# Patient Record
Sex: MALE | Race: OTHER | URBAN - METROPOLITAN AREA
[De-identification: names, ages, dates, MRNs, and addresses within clinical notes are randomized per-mention and may not be internally consistent; named-entity substitution may affect disease eponyms.]

---

## 2017-03-13 ENCOUNTER — TELEPHONE (OUTPATIENT)
Dept: FAMILY MEDICINE CLINIC | Facility: CLINIC | Age: 47
End: 2017-03-13

## 2017-03-13 NOTE — TELEPHONE ENCOUNTER
Pt states he has been feeling very bad congested, headaches,cough, sore throat and fever. Pt states he has tried everything OTC and nothing has helped. Please advise.

## 2017-03-13 NOTE — TELEPHONE ENCOUNTER
Reason for Call/Chief Complaint: Congestion, cough , headache, sore throat   Onset: 1 week ago  Nursing Assessment/Associated Symptoms: Pt stated that for the past week he has been having congestion,sore throat and a headache.  Pt stated that the fever was

## 2017-03-14 ENCOUNTER — OFFICE VISIT (OUTPATIENT)
Dept: FAMILY MEDICINE CLINIC | Facility: CLINIC | Age: 47
End: 2017-03-14

## 2017-03-14 VITALS
HEART RATE: 79 BPM | HEIGHT: 66 IN | TEMPERATURE: 98 F | WEIGHT: 224.81 LBS | BODY MASS INDEX: 36.13 KG/M2 | DIASTOLIC BLOOD PRESSURE: 84 MMHG | SYSTOLIC BLOOD PRESSURE: 125 MMHG

## 2017-03-14 DIAGNOSIS — J32.9 SINUSITIS, UNSPECIFIED CHRONICITY, UNSPECIFIED LOCATION: Primary | ICD-10-CM

## 2017-03-14 PROCEDURE — 99212 OFFICE O/P EST SF 10 MIN: CPT | Performed by: FAMILY MEDICINE

## 2017-03-14 PROCEDURE — 99213 OFFICE O/P EST LOW 20 MIN: CPT | Performed by: FAMILY MEDICINE

## 2017-03-14 RX ORDER — AMOXICILLIN AND CLAVULANATE POTASSIUM 875; 125 MG/1; MG/1
1 TABLET, FILM COATED ORAL 2 TIMES DAILY
Qty: 20 TABLET | Refills: 0 | Status: SHIPPED | OUTPATIENT
Start: 2017-03-14 | End: 2017-03-24

## 2017-03-14 RX ORDER — FLUTICASONE PROPIONATE 50 MCG
2 SPRAY, SUSPENSION (ML) NASAL DAILY
Qty: 1 BOTTLE | Refills: 0 | Status: SHIPPED | OUTPATIENT
Start: 2017-03-14 | End: 2017-07-12

## 2017-03-14 NOTE — PROGRESS NOTES
Patient ID: Jose J Dean is a 42year old male. HPI  Patient presents with:  Nasal Congestion: sore throat, headaches x 1 week. He got sick on March 5, 2017. He is tried over-the-counter medicines but continues to be sick.   He is having a stuffy nos Negative for fever and fatigue. HENT: Positive for congestion, postnasal drip, rhinorrhea and sinus pressure. Respiratory: Positive for cough. Negative for chest tightness. Gastrointestinal: Negative for vomiting.    Neurological: Positive for heada visit:    Sinusitis, unspecified chronicity, unspecified location  -     Amoxicillin-Pot Clavulanate (AUGMENTIN) 875-125 MG Oral Tab;  Take 1 tablet by mouth 2 (two) times daily.  -     Fluticasone Propionate 50 MCG/ACT Nasal Suspension; 2 sprays by Nasal r

## 2017-07-19 ENCOUNTER — APPOINTMENT (OUTPATIENT)
Dept: OTHER | Facility: HOSPITAL | Age: 47
End: 2017-07-19
Attending: EMERGENCY MEDICINE

## 2023-10-05 ENCOUNTER — APPOINTMENT (OUTPATIENT)
Dept: CT IMAGING | Facility: HOSPITAL | Age: 53
End: 2023-10-05
Attending: EMERGENCY MEDICINE

## 2023-10-05 ENCOUNTER — HOSPITAL ENCOUNTER (OUTPATIENT)
Age: 53
Discharge: ANOTHER HOSPITAL | End: 2023-10-05
Attending: STUDENT IN AN ORGANIZED HEALTH CARE EDUCATION/TRAINING PROGRAM

## 2023-10-05 ENCOUNTER — HOSPITAL ENCOUNTER (EMERGENCY)
Facility: HOSPITAL | Age: 53
Discharge: HOME | End: 2023-10-05
Attending: EMERGENCY MEDICINE

## 2023-10-05 ENCOUNTER — APPOINTMENT (OUTPATIENT)
Dept: GENERAL RADIOLOGY | Age: 53
End: 2023-10-05
Attending: STUDENT IN AN ORGANIZED HEALTH CARE EDUCATION/TRAINING PROGRAM

## 2023-10-05 VITALS
WEIGHT: 240 LBS | BODY MASS INDEX: 36.37 KG/M2 | OXYGEN SATURATION: 96 % | HEART RATE: 72 BPM | DIASTOLIC BLOOD PRESSURE: 99 MMHG | HEIGHT: 68 IN | TEMPERATURE: 98 F | SYSTOLIC BLOOD PRESSURE: 114 MMHG | RESPIRATION RATE: 17 BRPM

## 2023-10-05 VITALS
HEART RATE: 73 BPM | TEMPERATURE: 98 F | SYSTOLIC BLOOD PRESSURE: 138 MMHG | RESPIRATION RATE: 16 BRPM | DIASTOLIC BLOOD PRESSURE: 87 MMHG | OXYGEN SATURATION: 99 %

## 2023-10-05 DIAGNOSIS — R07.9 CHEST PAIN OF UNCERTAIN ETIOLOGY: Primary | ICD-10-CM

## 2023-10-05 DIAGNOSIS — R07.9 CHEST PAIN, RULE OUT ACUTE MYOCARDIAL INFARCTION: Primary | ICD-10-CM

## 2023-10-05 LAB
#MXD IC: 0.2 X10ˆ3/UL (ref 0.1–1)
BUN BLD-MCNC: 17 MG/DL (ref 7–18)
CHLORIDE BLD-SCNC: 103 MMOL/L (ref 98–112)
CO2 BLD-SCNC: 24 MMOL/L (ref 21–32)
CREAT BLD-MCNC: 1.1 MG/DL
EGFRCR SERPLBLD CKD-EPI 2021: 82 ML/MIN/1.73M2 (ref 60–?)
GLUCOSE BLD-MCNC: 87 MG/DL (ref 70–99)
HCT VFR BLD AUTO: 46.6 %
HCT VFR BLD CALC: 51 %
HGB BLD-MCNC: 15 G/DL
ISTAT IONIZED CALCIUM FOR CHEM 8: 1.21 MMOL/L (ref 1.12–1.32)
LYMPHOCYTES # BLD AUTO: 2.1 X10ˆ3/UL (ref 1–4)
LYMPHOCYTES NFR BLD AUTO: 27.3 %
MCH RBC QN AUTO: 25.4 PG (ref 26–34)
MCHC RBC AUTO-ENTMCNC: 32.2 G/DL (ref 31–37)
MCV RBC AUTO: 79 FL (ref 80–100)
MIXED CELL %: 3.1 %
NEUTROPHILS # BLD AUTO: 5.3 X10ˆ3/UL (ref 1.5–7.7)
NEUTROPHILS NFR BLD AUTO: 69.6 %
PLATELET # BLD AUTO: 266 X10ˆ3/UL (ref 150–450)
POTASSIUM BLD-SCNC: 4 MMOL/L (ref 3.6–5.1)
RBC # BLD AUTO: 5.9 X10ˆ6/UL
SODIUM BLD-SCNC: 142 MMOL/L (ref 136–145)
TROPONIN I BLD-MCNC: <0.02 NG/ML
TROPONIN I HIGH SENSITIVITY: 8 NG/L
WBC # BLD AUTO: 7.6 X10ˆ3/UL (ref 4–11)

## 2023-10-05 PROCEDURE — 93005 ELECTROCARDIOGRAM TRACING: CPT

## 2023-10-05 PROCEDURE — 36415 COLL VENOUS BLD VENIPUNCTURE: CPT

## 2023-10-05 PROCEDURE — 85025 COMPLETE CBC W/AUTO DIFF WBC: CPT | Performed by: STUDENT IN AN ORGANIZED HEALTH CARE EDUCATION/TRAINING PROGRAM

## 2023-10-05 PROCEDURE — 93010 ELECTROCARDIOGRAM REPORT: CPT

## 2023-10-05 PROCEDURE — 80047 BASIC METABLC PNL IONIZED CA: CPT

## 2023-10-05 PROCEDURE — 71046 X-RAY EXAM CHEST 2 VIEWS: CPT | Performed by: STUDENT IN AN ORGANIZED HEALTH CARE EDUCATION/TRAINING PROGRAM

## 2023-10-05 PROCEDURE — 99284 EMERGENCY DEPT VISIT MOD MDM: CPT

## 2023-10-05 PROCEDURE — 99205 OFFICE O/P NEW HI 60 MIN: CPT

## 2023-10-05 PROCEDURE — 84484 ASSAY OF TROPONIN QUANT: CPT | Performed by: EMERGENCY MEDICINE

## 2023-10-05 PROCEDURE — 71260 CT THORAX DX C+: CPT | Performed by: EMERGENCY MEDICINE

## 2023-10-05 PROCEDURE — 84484 ASSAY OF TROPONIN QUANT: CPT

## 2023-10-05 NOTE — ED INITIAL ASSESSMENT (HPI)
Pt presents from [facility] s/p presenting to IC for intermittent chest pain for the last two days.  EKG abnormal with CXR showing widened mediastinum with request for CT to r/o aortic dissection or aneurysm/

## 2023-10-05 NOTE — DISCHARGE INSTRUCTIONS
Your chest x-ray is concerning for mild widening of the mediastinum. This is a nonspecific finding, but there are no prior for comparison. It is concerning as this finding can be associated with an aortic dissection which is tearing of the inner layer of the aorta which is one of the main vessels of the heart and this can be life-threatening. Therefore, I recommend you present immediately to the emergency department for further assessment.

## 2023-10-05 NOTE — ED INITIAL ASSESSMENT (HPI)
Patient arrives ambulatory with c/o intermittent chest pain since Monday along with right arm tingling. Reports mild chest pain at this time. Denies smoking, denies shortness of breath. Denies fever/cough.

## 2023-10-05 NOTE — DISCHARGE INSTRUCTIONS
Tylenol for mild discomfort. Return to the ER for changes or worsening. Read all instructions for further recommendations. Avoid significant exertional activity until seen by the cardiologist.  Please call for rapid appointment for further evaluation.

## 2023-10-06 LAB
ATRIAL RATE: 65 BPM
ATRIAL RATE: 72 BPM
P AXIS: 25 DEGREES
P AXIS: 39 DEGREES
P-R INTERVAL: 156 MS
P-R INTERVAL: 174 MS
Q-T INTERVAL: 396 MS
Q-T INTERVAL: 400 MS
QRS DURATION: 88 MS
QRS DURATION: 90 MS
QTC CALCULATION (BEZET): 416 MS
QTC CALCULATION (BEZET): 433 MS
R AXIS: 43 DEGREES
R AXIS: 45 DEGREES
T AXIS: 23 DEGREES
T AXIS: 27 DEGREES
VENTRICULAR RATE: 65 BPM
VENTRICULAR RATE: 72 BPM

## 2024-01-26 ENCOUNTER — HOSPITAL ENCOUNTER (OUTPATIENT)
Age: 54
Discharge: HOME | End: 2024-01-26
Payer: COMMERCIAL

## 2024-01-26 VITALS
SYSTOLIC BLOOD PRESSURE: 135 MMHG | DIASTOLIC BLOOD PRESSURE: 75 MMHG | OXYGEN SATURATION: 97 % | RESPIRATION RATE: 20 BRPM | HEART RATE: 83 BPM | TEMPERATURE: 97 F

## 2024-01-26 DIAGNOSIS — R09.82 POST-NASAL DRIP: ICD-10-CM

## 2024-01-26 DIAGNOSIS — J01.90 ACUTE NON-RECURRENT SINUSITIS, UNSPECIFIED LOCATION: Primary | ICD-10-CM

## 2024-01-26 PROCEDURE — 99213 OFFICE O/P EST LOW 20 MIN: CPT

## 2024-01-26 PROCEDURE — 99214 OFFICE O/P EST MOD 30 MIN: CPT

## 2024-01-26 RX ORDER — CETIRIZINE HYDROCHLORIDE 10 MG/1
10 TABLET ORAL DAILY
Qty: 30 TABLET | Refills: 0 | Status: SHIPPED | OUTPATIENT
Start: 2024-01-26 | End: 2024-02-25

## 2024-01-26 RX ORDER — FLUTICASONE PROPIONATE 50 MCG
2 SPRAY, SUSPENSION (ML) NASAL DAILY
Qty: 16 G | Refills: 0 | Status: SHIPPED | OUTPATIENT
Start: 2024-01-26 | End: 2024-02-25

## 2024-01-26 RX ORDER — AMOXICILLIN AND CLAVULANATE POTASSIUM 875; 125 MG/1; MG/1
1 TABLET, FILM COATED ORAL 2 TIMES DAILY
Qty: 14 TABLET | Refills: 0 | Status: SHIPPED | OUTPATIENT
Start: 2024-01-26 | End: 2024-02-02

## 2024-01-26 NOTE — ED PROVIDER NOTES
Patient Seen in: [facility]      History     Chief Complaint   Patient presents with    Cough     Stated Complaint: sinus    Subjective:   HPI    Patient is a 53-year-old Male with no significant past medical history, presenting to immediate care for evaluation of concern for possible sinus infection.  Onset: 1.5 months.  Initial cold.  Since has been having intermittent frontal headache, migraine headaches, nasal congestion, postnasal drip, cough, subjective wheezing.  Has been taking over-the-counter medications with minimal relief.  Symptoms are intermittent.  Worsening this week.  Concern for possible sinus infection.  Past history of sinus infection.  Denies any fevers.  No facial swelling.  No nasal drainage.  No neck stiffness.  No chest pain or shortness of breath.  No weakness or confusion.  Not immunocompromise.    Objective:   History reviewed. No pertinent past medical history.           Past Surgical History:   Procedure Laterality Date    REMOVAL GALLBLADDER                  Social History     Socioeconomic History    Marital status:    Tobacco Use    Smoking status: Never    Smokeless tobacco: Never   Vaping Use    Vaping Use: Never used   Substance and Sexual Activity    Alcohol use: Yes     Alcohol/week: 0.0 standard drinks of alcohol     Comment: 2 beers occasionally    Drug use: No    Sexual activity: Yes   Other Topics Concern    Caffeine Concern Yes     Comment: coffee, soda, 1 cup daily    Pt has a pacemaker No    Pt has a defibrillator No    Reaction to local anesthetic No              Review of Systems   Constitutional:  Negative for chills and fever.   HENT:  Positive for congestion, postnasal drip and sinus pain. Negative for trouble swallowing and voice change.    Eyes:  Negative for visual disturbance.   Respiratory:  Positive for cough.    Gastrointestinal:  Negative for diarrhea and vomiting.   Musculoskeletal:  Negative for neck stiffness.   Skin:  Negative for  rash.   Allergic/Immunologic: Negative for immunocompromised state.   Neurological:  Positive for headaches. Negative for dizziness, seizures, weakness and light-headedness.   Psychiatric/Behavioral:  Negative for confusion.    All other systems reviewed and are negative.      Positive for stated complaint: sinus  Other systems are as noted in HPI.  Constitutional and vital signs reviewed.      All other systems reviewed and negative except as noted above.    Physical Exam     ED Triage Vitals [01/26/24 1631]   /75   Pulse 83   Resp 20   Temp 97.2 °F (36.2 °C)   Temp src Temporal   SpO2 97 %   O2 Device None (Room air)       Current:/75   Pulse 83   Temp 97.2 °F (36.2 °C) (Temporal)   Resp 20   SpO2 97%         Physical Exam  Vitals and nursing note reviewed.   Constitutional:       General: He is not in acute distress.     Appearance: Normal appearance. He is obese. He is not ill-appearing, toxic-appearing or diaphoretic.      Comments: Alert, cooperative, pleasant, nontoxic-appearing   HENT:      Head: Normocephalic and atraumatic.      Right Ear: Tympanic membrane normal.      Left Ear: Tympanic membrane normal.      Nose: Congestion present.      Comments: Enlarged nasal turbinates, nasal congestion, postnasal drip.     Mouth/Throat:      Mouth: Mucous membranes are moist.      Pharynx: No oropharyngeal exudate or posterior oropharyngeal erythema.   Eyes:      Conjunctiva/sclera: Conjunctivae normal.   Cardiovascular:      Rate and Rhythm: Normal rate.      Pulses: Normal pulses.   Pulmonary:      Effort: Pulmonary effort is normal. No respiratory distress.      Breath sounds: Normal breath sounds.      Comments: Clear to auscultation bilaterally.  Musculoskeletal:         General: Normal range of motion.      Cervical back: Normal range of motion and neck supple. No rigidity.   Neurological:      General: No focal deficit present.      Mental Status: He is alert and oriented to person, place,  and time.      Motor: No weakness.      Gait: Gait normal.   Psychiatric:         Mood and Affect: Mood normal.         Behavior: Behavior normal.             ED Course   Labs Reviewed - No data to display        MDM     Dx: Chronic Sinusitis, Initial Encounter  No systemic symptoms  Overall well-appearing  Afebrile  Outpatient Management  Supportive Care  Rest Oral Hydration  Motrin/Tylenol as needed for pain/fever  Flonase nasal spray and oral antihistamine Zyrtec for nasal congestion/sinus pressure  Suspect underlying allergic rhinitis/allergies  Rx Augmentin twice daily for 7 days for acute bacterial sinusitis  Referral ENT for sinusitis  Discharge instructions on sinusitis  PCP follow-up  ED return precautions      Medical Decision Making      Disposition and Plan     Clinical Impression:  1. Acute non-recurrent sinusitis, unspecified location    2. Post-nasal drip         Disposition:  Discharge  1/26/2024  4:44 pm    Follow-up:  Declan Irby MD  [address] [place] [id number] [phone]      ENT (Ear Nose and Throat ) Doctor, As needed          Medications Prescribed:  Current Discharge Medication List        START taking these medications    Details   fluticasone propionate 50 MCG/ACT Nasal Suspension 2 sprays by Nasal route daily.  Qty: 16 g, Refills: 0      cetirizine 10 MG Oral Tab Take 1 tablet (10 mg total) by mouth daily.  Qty: 30 tablet, Refills: 0      amoxicillin clavulanate 875-125 MG Oral Tab Take 1 tablet by mouth 2 (two) times daily for 7 days.  Qty: 14 tablet, Refills: 0

## 2024-01-26 NOTE — ED INITIAL ASSESSMENT (HPI)
Patient arrived ambulatory to room c/o symptoms that started 1.5 months ago. +nasal congestion +cough. No fevers. No n/v/d. Easy non labored respirations. No distress.

## (undated) NOTE — MR AVS SNAPSHOT
[address], [address]  [address] [facility]  [phone]               Thank you for choosing us for your health care visit with Ton De La Rosa DO.   We are glad to serve you and happy to provide you with this summary [address] [facility] [address]    Hours:  24-hours Phone:  [phone]    - Amoxicillin-Pot Clavulanate 875-125 MG Tabs  - Fluticasone Propionate 50 MCG/ACT Susp            MyChart               Educational Information     Healthy Diet and Regular E